# Patient Record
Sex: MALE | Race: WHITE | NOT HISPANIC OR LATINO | Employment: FULL TIME | URBAN - METROPOLITAN AREA
[De-identification: names, ages, dates, MRNs, and addresses within clinical notes are randomized per-mention and may not be internally consistent; named-entity substitution may affect disease eponyms.]

---

## 2019-04-26 ENCOUNTER — OFFICE VISIT (OUTPATIENT)
Dept: URGENT CARE | Facility: CLINIC | Age: 19
End: 2019-04-26
Payer: COMMERCIAL

## 2019-04-26 VITALS
HEART RATE: 75 BPM | BODY MASS INDEX: 35.82 KG/M2 | RESPIRATION RATE: 20 BRPM | DIASTOLIC BLOOD PRESSURE: 60 MMHG | TEMPERATURE: 97 F | SYSTOLIC BLOOD PRESSURE: 129 MMHG | OXYGEN SATURATION: 99 % | WEIGHT: 215 LBS | HEIGHT: 65 IN

## 2019-04-26 DIAGNOSIS — Z57.9 OCCUPATIONAL EXPOSURE IN WORKPLACE: Primary | ICD-10-CM

## 2019-04-26 PROCEDURE — 99203 PR OFFICE/OUTPT VISIT, NEW, LEVL III, 30-44 MIN: ICD-10-PCS | Mod: S$GLB,,, | Performed by: PHYSICIAN ASSISTANT

## 2019-04-26 PROCEDURE — 99203 OFFICE O/P NEW LOW 30 MIN: CPT | Mod: S$GLB,,, | Performed by: PHYSICIAN ASSISTANT

## 2019-04-26 SDOH — SOCIAL DETERMINANTS OF HEALTH (SDOH): OCCUPATIONAL EXPOSURE TO UNSPECIFIED RISK FACTOR: Z57.9

## 2019-04-26 NOTE — PATIENT INSTRUCTIONS
You should have your lab work repeated in 6 weeks and then again at 6 months.    1.  Take all medications as directed. If you have been prescribed antibiotics, make sure to complete them.   2.  Rest and keep yourself/patient well hydrated. For adults, it is recommended to drink at least 8-10 glasses of water daily.   3.  For patients above 6 months of age who are not allergic to and are not on anticoagulants, you can alternate Tylenol and Motrin every 4-6 hours for fever above 100.4F and/or pain.  For patients less than 6 months of age, allergic to or intolerant to NSAIDS, have gastritis, gastric ulcers, or history of GI bleeds, are pregnant, or are on anticoagulant therapy, you can take Tylenol every 4 hours as needed for fever above 100.4F and/or pain.   4. You should schedule a follow-up appointment with your Primary Care Provider/Pediatrician for recheck in 2-3 days or as directed at this visit.   5.  If your condition fails to improve in a timely manner, you should receive another evaluation by your Primary Care Provider/Pediatrician to discuss your concerns or return to urgent care for a recheck.  If your condition worsens at any time, you should report immediately to your nearest Emergency Department for further evaluation. **You must understand that you have received Urgent Care treatment only and that you may be released before all of your medical problems are known or treated. You, the patient, are responsible to arrange for follow-up care as instructed.         Standard Precautions: Needles and Other Sharps  Used needles, lancets, blades, and other sharp devices (known as sharps) can cut or prick you. This can expose you to bloodborne germs. Take time to handle sharps safely.  Handling sharps safely  Always move carefully while handling sharps. To prevent exposure to blood and OPIM (other potentially infectious materials):  · Never throw a sharp into the trash. And dont put a used sharp down. Dispose of  it in a marked sharps container as soon as youre done with it.  · Dont bend, break, or recap needles. Never remove used needles from disposable syringes.  · Get help before using sharps around confused or uncooperative patients.  · Make sure used sharps dont get left in linens or on bedside tables.  · Never clean up broken glass by hand.  Using sharps containers  Containers for the disposal of sharps will be provided by your facility. These containers must be puncture-proof and leakproof. They must be clearly marked with a biohazard label. Follow these tips for safe use of sharps containers:  · Never overfill a sharps container. Dispose of sharps containers according to your facilitys guidelines when theyre 2/3 full.  · Never force a sharp into a sharps container. Be careful and watch as you place sharps into the container.  · Never reach into a sharps container.  · Never open, empty, or reuse a sharps container.  Reporting a sharps exposure  Even when using standard precautions, you may be exposed to bloodborne pathogens on the job. Know the guidelines stated in your facilitys exposure control plan. These guidelines must be followed in cases of sharps exposures, splashes or sprays of blood or OPIM, or other exposures. If you have a sharps exposure:  · Wash the area well with soap and water. If your eyes are exposed, rinse them well with water only (dont use soap).  · Get medical attention right away. Time can be crucial in preventing infection. Your blood may need to be tested for HBV, HCV, and HIV. You may also receive vaccinations or post-exposure treatment to reduce your chances of becoming infected.  · Dont try to evaluate your own exposure.  · Report the exposure to your supervisor or other facility personnel. The patient whose blood or OPIM you were exposed to (if this is known) can be tested for a bloodborne infection. This helps determine whether you are at risk.   Date Last Reviewed: 2/6/2016  ©  9366-6784 [employer]. [address] [place] [address], [place] [address]. All rights reserved. This information is not intended as a substitute for professional medical care. Always follow your healthcare professional's instructions.

## 2019-04-26 NOTE — PROGRESS NOTES
"Subjective:       Patient ID: Fabien King is a 18 y.o. male.    Vitals:  height is 5' 5" (1.651 m) and weight is 97.5 kg (215 lb). His oral temperature is 97.4 °F (36.3 °C). His blood pressure is 129/60 and his pulse is 75. His respiration is 20 and oxygen saturation is 99%.     Chief Complaint: Body Fluid Exposure (right hand index finger)    18-year-old male presents to clinic today after being stuck in his right index finger with an instrument at work.  Patient states that the instrument was contaminated.  He states that he had several instruments to be put in the shaker.  He is unsure what patient this instrument was used on.  Patient denies any other complaints at this time.    Other   This is a new problem. The current episode started today. The problem occurs constantly. The problem has been unchanged. Pertinent negatives include no arthralgias, chest pain, chills, congestion, coughing, fatigue, fever, headaches, joint swelling, myalgias, nausea, rash, sore throat, vertigo or vomiting. Nothing aggravates the symptoms. He has tried nothing for the symptoms.       Constitution: Negative for chills, fatigue and fever.   HENT: Negative for congestion and sore throat.    Neck: Negative for painful lymph nodes.   Cardiovascular: Negative for chest pain and leg swelling.   Eyes: Negative for double vision and blurred vision.   Respiratory: Negative for cough and shortness of breath.    Gastrointestinal: Negative for nausea, vomiting and diarrhea.   Genitourinary: Negative for dysuria, frequency and urgency.   Musculoskeletal: Negative for joint pain, joint swelling, muscle cramps and muscle ache.   Skin: Positive for puncture wound. Negative for color change, pale, rash and erythema.   Allergic/Immunologic: Negative for seasonal allergies.   Neurological: Negative for dizziness, history of vertigo, light-headedness, passing out and headaches.   Hematologic/Lymphatic: Negative for swollen lymph nodes, easy " bruising/bleeding and history of blood clots. Does not bruise/bleed easily.   Psychiatric/Behavioral: Negative for nervous/anxious, sleep disturbance and depression. The patient is not nervous/anxious.        Objective:      Physical Exam   Constitutional: He is oriented to person, place, and time. He appears well-developed and well-nourished.   HENT:   Head: Normocephalic and atraumatic. Head is without abrasion, without contusion and without laceration.   Right Ear: External ear normal.   Left Ear: External ear normal.   Nose: Nose normal.   Mouth/Throat: Oropharynx is clear and moist.   Eyes: Pupils are equal, round, and reactive to light. Conjunctivae, EOM and lids are normal.   Neck: Trachea normal, full passive range of motion without pain and phonation normal. Neck supple.   Cardiovascular: Normal rate, regular rhythm and normal heart sounds.   Pulmonary/Chest: Effort normal and breath sounds normal. No stridor. No respiratory distress.   Musculoskeletal: Normal range of motion.        Hands:  Neurological: He is alert and oriented to person, place, and time.   Skin: Skin is warm, dry and intact. Capillary refill takes less than 2 seconds. No abrasion, no bruising, no burn, no ecchymosis, no laceration, no lesion and no rash noted. No erythema.   Psychiatric: He has a normal mood and affect. His speech is normal and behavior is normal. Judgment and thought content normal. Cognition and memory are normal.   Nursing note and vitals reviewed.      Assessment:       1. Occupational exposure in workplace    2. Needle stick injury with contaminated needle, initial encounter        Plan:         Occupational exposure in workplace  -     [facility]  -     HIV 1/2 Ag/Ab (4th Gen)  -     HEPATITIS PANEL, ACUTE    Needle stick injury with contaminated needle, initial encounter  -     [facility]  -     HIV 1/2 Ag/Ab (4th Gen)  -     HEPATITIS PANEL, ACUTE      Patient Instructions     You should have your lab work repeated in 6 weeks  and then again at 6 months.    1.  Take all medications as directed. If you have been prescribed antibiotics, make sure to complete them.   2.  Rest and keep yourself/patient well hydrated. For adults, it is recommended to drink at least 8-10 glasses of water daily.   3.  For patients above 6 months of age who are not allergic to and are not on anticoagulants, you can alternate Tylenol and Motrin every 4-6 hours for fever above 100.4F and/or pain.  For patients less than 6 months of age, allergic to or intolerant to NSAIDS, have gastritis, gastric ulcers, or history of GI bleeds, are pregnant, or are on anticoagulant therapy, you can take Tylenol every 4 hours as needed for fever above 100.4F and/or pain.   4. You should schedule a follow-up appointment with your Primary Care Provider/Pediatrician for recheck in 2-3 days or as directed at this visit.   5.  If your condition fails to improve in a timely manner, you should receive another evaluation by your Primary Care Provider/Pediatrician to discuss your concerns or return to urgent care for a recheck.  If your condition worsens at any time, you should report immediately to your nearest Emergency Department for further evaluation. **You must understand that you have received Urgent Care treatment only and that you may be released before all of your medical problems are known or treated. You, the patient, are responsible to arrange for follow-up care as instructed.         Standard Precautions: Needles and Other Sharps  Used needles, lancets, blades, and other sharp devices (known as sharps) can cut or prick you. This can expose you to bloodborne germs. Take time to handle sharps safely.  Handling sharps safely  Always move carefully while handling sharps. To prevent exposure to blood and OPIM (other potentially infectious materials):  · Never throw a sharp into the trash. And dont put a used sharp down. Dispose of it in a marked sharps container as soon as youre  done with it.  · Dont bend, break, or recap needles. Never remove used needles from disposable syringes.  · Get help before using sharps around confused or uncooperative patients.  · Make sure used sharps dont get left in linens or on bedside tables.  · Never clean up broken glass by hand.  Using sharps containers  Containers for the disposal of sharps will be provided by your facility. These containers must be puncture-proof and leakproof. They must be clearly marked with a biohazard label. Follow these tips for safe use of sharps containers:  · Never overfill a sharps container. Dispose of sharps containers according to your facilitys guidelines when theyre 2/3 full.  · Never force a sharp into a sharps container. Be careful and watch as you place sharps into the container.  · Never reach into a sharps container.  · Never open, empty, or reuse a sharps container.  Reporting a sharps exposure  Even when using standard precautions, you may be exposed to bloodborne pathogens on the job. Know the guidelines stated in your facilitys exposure control plan. These guidelines must be followed in cases of sharps exposures, splashes or sprays of blood or OPIM, or other exposures. If you have a sharps exposure:  · Wash the area well with soap and water. If your eyes are exposed, rinse them well with water only (dont use soap).  · Get medical attention right away. Time can be crucial in preventing infection. Your blood may need to be tested for HBV, HCV, and HIV. You may also receive vaccinations or post-exposure treatment to reduce your chances of becoming infected.  · Dont try to evaluate your own exposure.  · Report the exposure to your supervisor or other facility personnel. The patient whose blood or OPIM you were exposed to (if this is known) can be tested for a bloodborne infection. This helps determine whether you are at risk.   Date Last Reviewed: 2/6/2016  © 6329-1968 [employer]. [address] [place] [address]  [place] [address]. All rights reserved. This information is not intended as a substitute for professional medical care. Always follow your healthcare professional's instructions.

## 2019-04-26 NOTE — LETTER
April 26, 2019      [facility]  [address], [address]  [facility] [address]  Phone: [phone]  Fax: [phone]       Patient: Fabien King   YOB: 2000  Date of Visit: 04/26/2019    To Whom It May Concern:    Debra King  was at [facility] on 04/26/2019. He may return to work/school on 04/26/2019 with no restrictions. If you have any questions or concerns, or if I can be of further assistance, please do not hesitate to contact me.    Sincerely,      Lindsey Liriano PA-C

## 2019-04-28 ENCOUNTER — TELEPHONE (OUTPATIENT)
Dept: URGENT CARE | Facility: CLINIC | Age: 19
End: 2019-04-28

## 2019-04-28 LAB
HAV IGM SERPL QL IA: NEGATIVE
HBV CORE IGM SERPL QL IA: NEGATIVE
HBV SURFACE AG SERPL QL IA: NEGATIVE
HCV AB S/CO SERPL IA: <0.1 S/CO RATIO (ref 0–0.9)
HIV 1+2 AB+HIV1 P24 AG SERPL QL IA: NON REACTIVE
RPR SER QL: NON REACTIVE

## 2019-04-28 NOTE — TELEPHONE ENCOUNTER
Called to inform patient of negative lab results.  There was no answer.  Left message for patient to return my call.

## 2019-04-29 ENCOUNTER — TELEPHONE (OUTPATIENT)
Dept: URGENT CARE | Facility: CLINIC | Age: 19
End: 2019-04-29

## 2019-04-29 NOTE — TELEPHONE ENCOUNTER
Spoke to pt and delivered negative lab results. Confirmed 6 week repeat. He v/u results and instruction. Pt had no further questions or concerns.

## 2019-04-29 NOTE — TELEPHONE ENCOUNTER
Left message for pt with instructions to call back to discuss lab results from most recent visit.

## 2020-02-27 ENCOUNTER — TELEPHONE (OUTPATIENT)
Dept: URGENT CARE | Facility: CLINIC | Age: 20
End: 2020-02-27